# Patient Record
Sex: FEMALE | Race: OTHER | NOT HISPANIC OR LATINO
[De-identification: names, ages, dates, MRNs, and addresses within clinical notes are randomized per-mention and may not be internally consistent; named-entity substitution may affect disease eponyms.]

---

## 2021-10-01 ENCOUNTER — TRANSCRIPTION ENCOUNTER (OUTPATIENT)
Age: 21
End: 2021-10-01

## 2022-02-11 ENCOUNTER — TRANSCRIPTION ENCOUNTER (OUTPATIENT)
Age: 22
End: 2022-02-11

## 2022-04-22 ENCOUNTER — TRANSCRIPTION ENCOUNTER (OUTPATIENT)
Age: 22
End: 2022-04-22

## 2022-04-25 PROBLEM — Z00.00 ENCOUNTER FOR PREVENTIVE HEALTH EXAMINATION: Status: ACTIVE | Noted: 2022-04-25

## 2022-04-27 ENCOUNTER — APPOINTMENT (OUTPATIENT)
Dept: OTOLARYNGOLOGY | Facility: CLINIC | Age: 22
End: 2022-04-27

## 2022-04-27 VITALS
TEMPERATURE: 98.6 F | HEIGHT: 64 IN | DIASTOLIC BLOOD PRESSURE: 73 MMHG | BODY MASS INDEX: 30.73 KG/M2 | SYSTOLIC BLOOD PRESSURE: 118 MMHG | HEART RATE: 69 BPM | WEIGHT: 180 LBS

## 2022-04-27 DIAGNOSIS — K13.79 OTHER LESIONS OF ORAL MUCOSA: ICD-10-CM

## 2022-04-27 DIAGNOSIS — Z83.3 FAMILY HISTORY OF DIABETES MELLITUS: ICD-10-CM

## 2022-04-27 DIAGNOSIS — T16.1XXA FOREIGN BODY IN RIGHT EAR, INITIAL ENCOUNTER: ICD-10-CM

## 2022-04-27 DIAGNOSIS — H93.8X3 OTHER SPECIFIED DISORDERS OF EAR, BILATERAL: ICD-10-CM

## 2022-04-27 DIAGNOSIS — Z78.9 OTHER SPECIFIED HEALTH STATUS: ICD-10-CM

## 2022-04-27 DIAGNOSIS — H61.23 IMPACTED CERUMEN, BILATERAL: ICD-10-CM

## 2022-04-27 DIAGNOSIS — H93.11 TINNITUS, RIGHT EAR: ICD-10-CM

## 2022-04-27 DIAGNOSIS — Z82.49 FAMILY HISTORY OF ISCHEMIC HEART DISEASE AND OTHER DISEASES OF THE CIRCULATORY SYSTEM: ICD-10-CM

## 2022-04-27 PROCEDURE — 99203 OFFICE O/P NEW LOW 30 MIN: CPT | Mod: 25

## 2022-04-27 PROCEDURE — 69200 CLEAR OUTER EAR CANAL: CPT | Mod: RT

## 2022-04-27 RX ORDER — DEXAMETHASONE SODIUM PHOSPHATE 0.1 %
0.1 DROPS OPHTHALMIC (EYE)
Refills: 0 | Status: COMPLETED | COMMUNITY

## 2022-04-27 RX ORDER — CIPROFLOXACIN AND DEXAMETHASONE 3; 1 MG/ML; MG/ML
0.3-0.1 SUSPENSION/ DROPS AURICULAR (OTIC)
Refills: 0 | Status: ACTIVE | COMMUNITY

## 2022-04-27 RX ORDER — AZITHROMYCIN 250 MG/1
250 TABLET, FILM COATED ORAL
Refills: 0 | Status: COMPLETED | COMMUNITY

## 2022-04-28 PROBLEM — H61.23 BILATERAL IMPACTED CERUMEN: Status: ACTIVE | Noted: 2022-04-28

## 2022-04-28 PROBLEM — H93.8X3 SENSATION OF PLUGGED EAR ON BOTH SIDES: Status: ACTIVE | Noted: 2022-04-28

## 2022-06-30 PROBLEM — H93.11 TINNITUS, RIGHT: Status: ACTIVE | Noted: 2022-06-30

## 2022-06-30 PROBLEM — K13.79 PAIN IN MOUTH: Status: ACTIVE | Noted: 2022-06-30

## 2022-06-30 PROBLEM — T16.1XXA FOREIGN BODY OF RIGHT EAR, INITIAL ENCOUNTER: Status: ACTIVE | Noted: 2022-06-30

## 2022-06-30 NOTE — ASSESSMENT
[FreeTextEntry1] : Ms. LINDSEY was evaluated for the following issues today:\par \par 1.) Right ear clogging, muffled hearing and tinnitus relieved after removal of cerumen impactions and FB (cotton tip).  No evidence of infection in ears.\par --> Stop Ciprodex gtts\par --> NO Qtips in ears\par \par 2.) Right mouth pain likely related to tooth\par --> f/up with dentist\par \par \par Return as needed

## 2022-06-30 NOTE — REVIEW OF SYSTEMS
[Patient Intake Form Reviewed] : Patient intake form was reviewed [As Noted in HPI] : as noted in HPI [Throat Pain] : throat pain [Feeling Tired] : feeling tired [Chest Pain] : chest pain [Shortness Of Breath] : shortness of breath [Negative] : Heme/Lymph [Seasonal Allergies] : seasonal allergies [de-identified] : trouble swallowing [FreeTextEntry6] : chest congestion [de-identified] : excess urination

## 2022-06-30 NOTE — HISTORY OF PRESENT ILLNESS
[de-identified] : Ms. LINDSEY is a 21 year old woman who feels right ear clogging that comes and goes for a year. \par She saw her PCP who saw wax in her right ear. \par She was using Debrox which expanded the wax. She was pushing the wax with a Qtip, and she had pain after scratched her ear.  Went to urgent care where she was put on Ciprodex ear drops 3-4 days ago. \par Now right ear pain is improved. \par Hearing loss and sometimes ringing in the right ear for past few weeks. \par She wakes up in the morning with tooth pain and sore throat that she feels is due to right lower wisdom tooth.\par She completed azithromycin for her sore throat. On black seed oil drops.\par Gets some runny nose, has seasonal allergies. \par No acid reflux. \par

## 2022-06-30 NOTE — PHYSICAL EXAM
[FreeTextEntry1] : No hoarseness.  [de-identified] : Bilateral cerumen impactions removed with suction.  Cotton tip FB removed with alligator from medial RIGHT EAC. [Midline] : trachea located in midline position [de-identified] : Mild tenderness of gingiva around right lower wisdom tooth. [Normal] : no rashes [de-identified] : Carotid pulses 2+ bilateral.

## 2024-03-16 ENCOUNTER — EMERGENCY (EMERGENCY)
Facility: HOSPITAL | Age: 24
LOS: 1 days | Discharge: HOME | End: 2024-03-16
Attending: EMERGENCY MEDICINE | Admitting: EMERGENCY MEDICINE
Payer: COMMERCIAL

## 2024-03-16 VITALS
HEART RATE: 62 BPM | TEMPERATURE: 98 F | SYSTOLIC BLOOD PRESSURE: 100 MMHG | DIASTOLIC BLOOD PRESSURE: 66 MMHG | OXYGEN SATURATION: 99 % | RESPIRATION RATE: 18 BRPM

## 2024-03-16 VITALS
WEIGHT: 182.98 LBS | DIASTOLIC BLOOD PRESSURE: 83 MMHG | OXYGEN SATURATION: 99 % | TEMPERATURE: 98 F | HEART RATE: 71 BPM | RESPIRATION RATE: 18 BRPM | HEIGHT: 63 IN | SYSTOLIC BLOOD PRESSURE: 121 MMHG

## 2024-03-16 DIAGNOSIS — S29.012A STRAIN OF MUSCLE AND TENDON OF BACK WALL OF THORAX, INITIAL ENCOUNTER: ICD-10-CM

## 2024-03-16 DIAGNOSIS — S16.1XXA STRAIN OF MUSCLE, FASCIA AND TENDON AT NECK LEVEL, INITIAL ENCOUNTER: ICD-10-CM
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
## 2024-03-16 DIAGNOSIS — M54.2 CERVICALGIA: ICD-10-CM
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
## 2024-03-16 LAB — HCG UR QL: NEGATIVE — SIGNIFICANT CHANGE UP

## 2024-03-16 PROCEDURE — 72040 X-RAY EXAM NECK SPINE 2-3 VW: CPT | Mod: 26

## 2024-03-16 PROCEDURE — 72070 X-RAY EXAM THORAC SPINE 2VWS: CPT

## 2024-03-16 PROCEDURE — 99284 EMERGENCY DEPT VISIT MOD MDM: CPT | Mod: 25

## 2024-03-16 PROCEDURE — 72040 X-RAY EXAM NECK SPINE 2-3 VW: CPT

## 2024-03-16 PROCEDURE — 72070 X-RAY EXAM THORAC SPINE 2VWS: CPT | Mod: 26

## 2024-03-16 PROCEDURE — 81025 URINE PREGNANCY TEST: CPT

## 2024-03-16 PROCEDURE — 99284 EMERGENCY DEPT VISIT MOD MDM: CPT

## 2024-03-16 RX ORDER — IBUPROFEN 200 MG
400 TABLET ORAL ONCE
Refills: 0 | Status: COMPLETED | OUTPATIENT
Start: 2024-03-16 | End: 2024-03-16

## 2024-03-16 RX ADMIN — Medication 400 MILLIGRAM(S): at 22:37
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
## 2024-03-16 NOTE — ED PROVIDER NOTE - NSFOLLOWUPINSTRUCTIONS_ED_ALL_ED_FT
CERVICAL STRAIN - General Information    Cervical Strain    WHAT YOU NEED TO KNOW:    What is a cervical strain? A cervical strain is a stretched or torn muscle or tendon in your neck. Tendons are strong tissues that connect muscles to bones.    What are the signs and symptoms of a cervical strain?    Pain and stiffness    Limited movement    Headache    Swelling or bruising    Ringing in the ears    Dizziness or vertigo  How is a cervical strain diagnosed? Your healthcare provider will check your movement, balance, and strength. An x-ray, CT, or MRI may show the injury. You may be given contrast liquid to help your neck show up better in the pictures. Tell the healthcare provider if you have ever had an allergic reaction to contrast liquid. Do not enter the MRI room with anything metal. Metal can cause serious injury. Tell the healthcare provider if you have any metal in or on your body.    How is a cervical strain treated? You may need any of the following:    Acetaminophen decreases pain and fever. It is available without a doctor's order. Ask how much to take and how often to take it. Follow directions. Read the labels of all other medicines you are using to see if they also contain acetaminophen, or ask your doctor or pharmacist. Acetaminophen can cause liver damage if not taken correctly.    NSAIDs, such as ibuprofen, help decrease swelling, pain, and fever. This medicine is available with or without a doctor's order. NSAIDs can cause stomach bleeding or kidney problems in certain people. If you take blood thinner medicine, always ask your healthcare provider if NSAIDs are safe for you. Always read the medicine label and follow directions.    Muscle relaxers help decrease pain and muscle spasms.    Prescription pain medicine may be given. Ask your healthcare provider how to take this medicine safely. Some prescription pain medicines contain acetaminophen. Do not take other medicines that contain acetaminophen without talking to your healthcare provider. Too much acetaminophen may cause liver damage. Prescription pain medicine may cause constipation. Ask your healthcare provider how to prevent or treat constipation.  How can I manage my symptoms?    Apply heat on your neck for 15 to 20 minutes, 4 to 6 times a day or as directed. Heat helps decrease pain, stiffness, and muscle spasms.    Begin gentle neck exercises as soon as you can move your neck without pain. Exercises will help decrease stiffness and improve the strength and movement of your neck. Ask your healthcare provider what kind of exercises you should do.    Gradually return to your usual activities as directed. Stop if you have pain. Avoid activities that can cause more damage to your neck, such as heavy lifting or strenuous exercise.    Sleep without a pillow to help decrease pain. Instead, roll a small towel tightly and place it under your neck.    Go to physical therapy as directed. A physical therapist teaches you exercises to help improve movement and strength, and to decrease pain.  Prevent another neck injury:    Drive safely. Make sure everyone in your car wears a seatbelt. A seatbelt can save your life if you are in an accident. Do not use your cell phone when you are driving. This could distract you and cause an accident. Pull over if you need to make a call or send a text message.    Wear helmets, lifejackets, and protective gear. Always wear a helmet when you ride a bike or motorcycle, go skiing, or play sports that could cause a head injury. Wear protective equipment when you play sports. Wear a lifejacket when you are on a boat or doing water sports.  When should I seek immediate care?    You have pain or numbness from your shoulder down to your hand.    You have problems with your vision, hearing, or balance.    You feel confused or cannot concentrate.    You have problems with movement and strength.  When should I call my doctor?    You have increased swelling or pain in your neck.    You have questions or concerns about your condition or care.  CARE AGREEMENT:    You have the right to help plan your care. Learn about your health condition and how it may be treated. Discuss treatment options with your healthcare providers to decide what care you want to receive. You always have the right to refuse treatment.    © [employer] 1973, 2024    	  back to top            © [employer] 1973, 2024

## 2024-03-16 NOTE — ED PROVIDER NOTE - MUSCULOSKELETAL, MLM
Spine appears normal, range of motion is not limited, no muscle or joint tenderness paraspinal ttp cervical and thoracic
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
